# Patient Record
Sex: MALE | Race: WHITE | NOT HISPANIC OR LATINO
[De-identification: names, ages, dates, MRNs, and addresses within clinical notes are randomized per-mention and may not be internally consistent; named-entity substitution may affect disease eponyms.]

---

## 2017-07-06 ENCOUNTER — APPOINTMENT (OUTPATIENT)
Dept: GASTROENTEROLOGY | Facility: CLINIC | Age: 72
End: 2017-07-06

## 2017-07-06 VITALS
WEIGHT: 219 LBS | HEART RATE: 76 BPM | HEIGHT: 71 IN | DIASTOLIC BLOOD PRESSURE: 84 MMHG | SYSTOLIC BLOOD PRESSURE: 143 MMHG | RESPIRATION RATE: 18 BRPM | BODY MASS INDEX: 30.66 KG/M2

## 2017-07-06 DIAGNOSIS — K64.4 RESIDUAL HEMORRHOIDAL SKIN TAGS: ICD-10-CM

## 2017-07-06 DIAGNOSIS — Z87.438 PERSONAL HISTORY OF OTHER DISEASES OF MALE GENITAL ORGANS: ICD-10-CM

## 2017-07-06 RX ORDER — LIFITEGRAST 50 MG/ML
5 SOLUTION/ DROPS OPHTHALMIC
Qty: 60 | Refills: 0 | Status: DISCONTINUED | COMMUNITY
Start: 2017-05-31

## 2017-09-11 ENCOUNTER — TRANSCRIPTION ENCOUNTER (OUTPATIENT)
Age: 72
End: 2017-09-11

## 2017-10-04 ENCOUNTER — OUTPATIENT (OUTPATIENT)
Dept: OUTPATIENT SERVICES | Facility: HOSPITAL | Age: 72
LOS: 1 days | End: 2017-10-04
Payer: MEDICARE

## 2017-10-04 ENCOUNTER — RESULT REVIEW (OUTPATIENT)
Age: 72
End: 2017-10-04

## 2017-10-04 ENCOUNTER — APPOINTMENT (OUTPATIENT)
Dept: GASTROENTEROLOGY | Facility: GI CENTER | Age: 72
End: 2017-10-04
Payer: MEDICARE

## 2017-10-04 DIAGNOSIS — K21.9 GASTRO-ESOPHAGEAL REFLUX DISEASE WITHOUT ESOPHAGITIS: ICD-10-CM

## 2017-10-04 PROCEDURE — 88305 TISSUE EXAM BY PATHOLOGIST: CPT

## 2017-10-04 PROCEDURE — 88342 IMHCHEM/IMCYTCHM 1ST ANTB: CPT

## 2017-10-04 PROCEDURE — 88342 IMHCHEM/IMCYTCHM 1ST ANTB: CPT | Mod: 26

## 2017-10-04 PROCEDURE — 88305 TISSUE EXAM BY PATHOLOGIST: CPT | Mod: 26

## 2017-10-04 PROCEDURE — 43239 EGD BIOPSY SINGLE/MULTIPLE: CPT

## 2017-10-06 LAB — SURGICAL PATHOLOGY FINAL REPORT - CH: SIGNIFICANT CHANGE UP

## 2017-12-29 ENCOUNTER — RX RENEWAL (OUTPATIENT)
Age: 72
End: 2017-12-29

## 2021-04-25 ENCOUNTER — EMERGENCY (EMERGENCY)
Facility: HOSPITAL | Age: 76
LOS: 1 days | Discharge: HOME | End: 2021-04-25
Attending: EMERGENCY MEDICINE
Payer: MEDICARE

## 2021-04-25 VITALS
WEIGHT: 210.1 LBS | RESPIRATION RATE: 20 BRPM | OXYGEN SATURATION: 99 % | DIASTOLIC BLOOD PRESSURE: 78 MMHG | TEMPERATURE: 95 F | HEART RATE: 102 BPM | SYSTOLIC BLOOD PRESSURE: 119 MMHG | HEIGHT: 69 IN

## 2021-04-25 LAB
ALBUMIN SERPL ELPH-MCNC: 4.2 G/DL — SIGNIFICANT CHANGE UP (ref 3.3–5.2)
ALP SERPL-CCNC: 131 U/L — HIGH (ref 40–120)
ALT FLD-CCNC: 33 U/L — SIGNIFICANT CHANGE UP
ANION GAP SERPL CALC-SCNC: 17 MMOL/L — SIGNIFICANT CHANGE UP (ref 5–17)
APPEARANCE UR: CLEAR — SIGNIFICANT CHANGE UP
APTT BLD: 29.6 SEC — SIGNIFICANT CHANGE UP (ref 27.5–35.5)
AST SERPL-CCNC: 20 U/L — SIGNIFICANT CHANGE UP
BACTERIA # UR AUTO: ABNORMAL
BASE EXCESS BLDV CALC-SCNC: -5.4 MMOL/L — LOW (ref -2–2)
BASE EXCESS BLDV CALC-SCNC: -5.9 MMOL/L — LOW (ref -2–2)
BASOPHILS # BLD AUTO: 0.07 K/UL — SIGNIFICANT CHANGE UP (ref 0–0.2)
BASOPHILS NFR BLD AUTO: 0.9 % — SIGNIFICANT CHANGE UP (ref 0–2)
BILIRUB SERPL-MCNC: 0.4 MG/DL — SIGNIFICANT CHANGE UP (ref 0.4–2)
BILIRUB UR-MCNC: NEGATIVE — SIGNIFICANT CHANGE UP
BUN SERPL-MCNC: 15 MG/DL — SIGNIFICANT CHANGE UP (ref 8–20)
CA-I SERPL-SCNC: 1.02 MMOL/L — LOW (ref 1.15–1.33)
CA-I SERPL-SCNC: 1.06 MMOL/L — LOW (ref 1.15–1.33)
CALCIUM SERPL-MCNC: 8.3 MG/DL — LOW (ref 8.6–10.2)
CHLORIDE BLDV-SCNC: 103 MMOL/L — SIGNIFICANT CHANGE UP (ref 98–107)
CHLORIDE BLDV-SCNC: 104 MMOL/L — SIGNIFICANT CHANGE UP (ref 98–107)
CHLORIDE SERPL-SCNC: 98 MMOL/L — SIGNIFICANT CHANGE UP (ref 98–107)
CO2 SERPL-SCNC: 20 MMOL/L — LOW (ref 22–29)
COLOR SPEC: YELLOW — SIGNIFICANT CHANGE UP
CREAT SERPL-MCNC: 1.02 MG/DL — SIGNIFICANT CHANGE UP (ref 0.5–1.3)
DIFF PNL FLD: NEGATIVE — SIGNIFICANT CHANGE UP
EOSINOPHIL # BLD AUTO: 0.27 K/UL — SIGNIFICANT CHANGE UP (ref 0–0.5)
EOSINOPHIL NFR BLD AUTO: 3.5 % — SIGNIFICANT CHANGE UP (ref 0–6)
EPI CELLS # UR: SIGNIFICANT CHANGE UP
GAS PNL BLDV: 137 MMOL/L — SIGNIFICANT CHANGE UP (ref 135–145)
GAS PNL BLDV: 138 MMOL/L — SIGNIFICANT CHANGE UP (ref 135–145)
GAS PNL BLDV: SIGNIFICANT CHANGE UP
GLUCOSE BLDV-MCNC: 141 MG/DL — HIGH (ref 70–99)
GLUCOSE BLDV-MCNC: 159 MG/DL — HIGH (ref 70–99)
GLUCOSE SERPL-MCNC: 151 MG/DL — HIGH (ref 70–99)
GLUCOSE UR QL: 1000 MG/DL
HCO3 BLDV-SCNC: 19 MMOL/L — LOW (ref 20–26)
HCO3 BLDV-SCNC: 20 MMOL/L — LOW (ref 21–29)
HCT VFR BLD CALC: 49 % — SIGNIFICANT CHANGE UP (ref 39–50)
HCT VFR BLDA CALC: 50 — SIGNIFICANT CHANGE UP (ref 39–50)
HCT VFR BLDA CALC: 52 — HIGH (ref 39–50)
HGB BLD CALC-MCNC: 16.4 G/DL — SIGNIFICANT CHANGE UP (ref 13–17)
HGB BLD CALC-MCNC: 17 G/DL — SIGNIFICANT CHANGE UP (ref 13–17)
HGB BLD-MCNC: 16.1 G/DL — SIGNIFICANT CHANGE UP (ref 13–17)
IMM GRANULOCYTES NFR BLD AUTO: 1.5 % — SIGNIFICANT CHANGE UP (ref 0–1.5)
INR BLD: 0.95 RATIO — SIGNIFICANT CHANGE UP (ref 0.88–1.16)
KETONES UR-MCNC: NEGATIVE — SIGNIFICANT CHANGE UP
LACTATE BLDV-MCNC: 2.9 MMOL/L — HIGH (ref 0.5–2)
LACTATE BLDV-MCNC: 3.6 MMOL/L — HIGH (ref 0.5–2)
LEUKOCYTE ESTERASE UR-ACNC: NEGATIVE — SIGNIFICANT CHANGE UP
LYMPHOCYTES # BLD AUTO: 1.36 K/UL — SIGNIFICANT CHANGE UP (ref 1–3.3)
LYMPHOCYTES # BLD AUTO: 17.4 % — SIGNIFICANT CHANGE UP (ref 13–44)
MAGNESIUM SERPL-MCNC: 2.1 MG/DL — SIGNIFICANT CHANGE UP (ref 1.6–2.6)
MCHC RBC-ENTMCNC: 30.6 PG — SIGNIFICANT CHANGE UP (ref 27–34)
MCHC RBC-ENTMCNC: 32.9 GM/DL — SIGNIFICANT CHANGE UP (ref 32–36)
MCV RBC AUTO: 93.2 FL — SIGNIFICANT CHANGE UP (ref 80–100)
MONOCYTES # BLD AUTO: 0.86 K/UL — SIGNIFICANT CHANGE UP (ref 0–0.9)
MONOCYTES NFR BLD AUTO: 11 % — SIGNIFICANT CHANGE UP (ref 2–14)
NEUTROPHILS # BLD AUTO: 5.12 K/UL — SIGNIFICANT CHANGE UP (ref 1.8–7.4)
NEUTROPHILS NFR BLD AUTO: 65.7 % — SIGNIFICANT CHANGE UP (ref 43–77)
NITRITE UR-MCNC: NEGATIVE — SIGNIFICANT CHANGE UP
NT-PROBNP SERPL-SCNC: 34 PG/ML — SIGNIFICANT CHANGE UP (ref 0–300)
OTHER CELLS CSF MANUAL: 12 ML/DL — LOW (ref 18–22)
OTHER CELLS CSF MANUAL: 22 ML/DL — SIGNIFICANT CHANGE UP (ref 18–22)
PCO2 BLDV: 45 MMHG — SIGNIFICANT CHANGE UP (ref 42–55)
PCO2 BLDV: 54 MMHG — SIGNIFICANT CHANGE UP (ref 42–55)
PH BLDV: 7.24 — LOW (ref 7.32–7.43)
PH BLDV: 7.28 — LOW (ref 7.32–7.43)
PH UR: 6 — SIGNIFICANT CHANGE UP (ref 5–8)
PLATELET # BLD AUTO: 154 K/UL — SIGNIFICANT CHANGE UP (ref 150–400)
PO2 BLDV: 34 MMHG — SIGNIFICANT CHANGE UP (ref 25–45)
PO2 BLDV: 81 MMHG — HIGH (ref 25–45)
POTASSIUM BLDV-SCNC: 3.8 MMOL/L — SIGNIFICANT CHANGE UP (ref 3.4–4.5)
POTASSIUM BLDV-SCNC: 4.2 MMOL/L — SIGNIFICANT CHANGE UP (ref 3.4–4.5)
POTASSIUM SERPL-MCNC: 3.9 MMOL/L — SIGNIFICANT CHANGE UP (ref 3.5–5.3)
POTASSIUM SERPL-SCNC: 3.9 MMOL/L — SIGNIFICANT CHANGE UP (ref 3.5–5.3)
PROT SERPL-MCNC: 6.7 G/DL — SIGNIFICANT CHANGE UP (ref 6.6–8.7)
PROT UR-MCNC: 15 MG/DL
PROTHROM AB SERPL-ACNC: 11 SEC — SIGNIFICANT CHANGE UP (ref 10.6–13.6)
RBC # BLD: 5.26 M/UL — SIGNIFICANT CHANGE UP (ref 4.2–5.8)
RBC # FLD: 13.3 % — SIGNIFICANT CHANGE UP (ref 10.3–14.5)
RBC CASTS # UR COMP ASSIST: SIGNIFICANT CHANGE UP /HPF (ref 0–4)
SAO2 % BLDV: 54 % — SIGNIFICANT CHANGE UP
SAO2 % BLDV: 95 % — SIGNIFICANT CHANGE UP
SODIUM SERPL-SCNC: 135 MMOL/L — SIGNIFICANT CHANGE UP (ref 135–145)
SP GR SPEC: 1.01 — SIGNIFICANT CHANGE UP (ref 1.01–1.02)
TROPONIN T SERPL-MCNC: <0.01 NG/ML — SIGNIFICANT CHANGE UP (ref 0–0.06)
TSH SERPL-MCNC: 1.44 UIU/ML — SIGNIFICANT CHANGE UP (ref 0.27–4.2)
UROBILINOGEN FLD QL: NEGATIVE MG/DL — SIGNIFICANT CHANGE UP
WBC # BLD: 7.8 K/UL — SIGNIFICANT CHANGE UP (ref 3.8–10.5)
WBC # FLD AUTO: 7.8 K/UL — SIGNIFICANT CHANGE UP (ref 3.8–10.5)
WBC UR QL: SIGNIFICANT CHANGE UP

## 2021-04-25 PROCEDURE — 93010 ELECTROCARDIOGRAM REPORT: CPT

## 2021-04-25 PROCEDURE — 71045 X-RAY EXAM CHEST 1 VIEW: CPT | Mod: 26

## 2021-04-25 PROCEDURE — 99285 EMERGENCY DEPT VISIT HI MDM: CPT | Mod: CS

## 2021-04-25 RX ORDER — SODIUM CHLORIDE 9 MG/ML
1000 INJECTION, SOLUTION INTRAVENOUS ONCE
Refills: 0 | Status: COMPLETED | OUTPATIENT
Start: 2021-04-25 | End: 2021-04-25

## 2021-04-25 RX ORDER — SODIUM CHLORIDE 9 MG/ML
1000 INJECTION INTRAMUSCULAR; INTRAVENOUS; SUBCUTANEOUS ONCE
Refills: 0 | Status: COMPLETED | OUTPATIENT
Start: 2021-04-25 | End: 2021-04-25

## 2021-04-25 RX ADMIN — SODIUM CHLORIDE 1000 MILLILITER(S): 9 INJECTION, SOLUTION INTRAVENOUS at 21:14

## 2021-04-25 RX ADMIN — SODIUM CHLORIDE 1000 MILLILITER(S): 9 INJECTION INTRAMUSCULAR; INTRAVENOUS; SUBCUTANEOUS at 22:57

## 2021-04-25 RX ADMIN — SODIUM CHLORIDE 1000 MILLILITER(S): 9 INJECTION, SOLUTION INTRAVENOUS at 22:14

## 2021-04-25 NOTE — ED PROVIDER NOTE - NSFOLLOWUPINSTRUCTIONS_ED_ALL_ED_FT
- Follow up with your doctor within 2-3 days.   - Follow up with your Cardiologist.   - Bring results with you to the appointment.   - Take Tylenol (Acetaminophen) 650mg or Motrin (Ibuprofen/Advil) 600mg every 6 hours as needed for fever or pain.   - Stay well hydrated.   - Stay at home until cleared by your doctor.  - See attached COVID-19 instructions.   - Return to the ED for any new or worsening symptoms.     Syncope    Syncope is when you temporarily lose consciousness, also called fainting or passing out. It is caused by a sudden decrease in blood flow to the brain. Even though most causes of syncope are not dangerous, syncope can possibly be a sign of a serious medical problem. Signs that you may be about to faint include feeling dizzy, lightheaded, nausea, visual changes, or cold/clammy skin. Do not drive, operate heavy machinery, or play sports until your health care provider says it is okay.    SEEK IMMEDIATE MEDICAL CARE IF YOU HAVE ANY OF THE FOLLOWING SYMPTOMS: severe headache, pain in your chest/abdomen/back, bleeding from your mouth or rectum, palpitations, shortness of breath, pain with breathing, seizure, confusion, or trouble walking.

## 2021-04-25 NOTE — ED PROVIDER NOTE - PATIENT PORTAL LINK FT
You can access the FollowMyHealth Patient Portal offered by [facility] by registering at the following website: [email or web address]. By joining [employer]’s FollowMyHealth portal, you will also be able to view your health information using other applications (apps) compatible with our system.

## 2021-04-25 NOTE — ED PROVIDER NOTE - OBJECTIVE STATEMENT
75yom w/ HTN p/w syncope. Pt states he was sitting around with friends smoking cigars and having a beer, he had a sudden onset of "not feeling right," followed by witnessed syncope, unknown duration. He awoke immediately back to baseline, but on EMS arrival was bradycardic in the 30s and hypotense. Was given atropine 0.5mg. He denies any complaints currently other than being cold. He has had similar episodes in the past of passing out, follows with a cardiologist.

## 2021-04-25 NOTE — ED ADULT TRIAGE NOTE - CHIEF COMPLAINT QUOTE
Pt A&Ox4 states "I felt strange and don't really remember what happened and then the ambulance was there."  BIBA c/o being at bar became pale, hypotensive, and laura. Patient received 1L NS and 0.5 Atropine by EMS. MD KOREY redmond.

## 2021-04-25 NOTE — ED ADULT NURSE NOTE - OBJECTIVE STATEMENT
pt reports he was at the bar having some drinks and felt funny, next thing he remembers is waking up with everyone around him. denies any injuries/fall during this syncopal event, states he was seated at the time. as per EMS pt was hypotensive, sinus bradycardia in the 30s, given 0.5mg Atropine IV. arrives with 18g LAC. pt without any complaints at this present time. a&ox3.

## 2021-04-25 NOTE — ED PROVIDER NOTE - CLINICAL SUMMARY MEDICAL DECISION MAKING FREE TEXT BOX
Brief, resolved syncopal event, was transiently hypotense and bradycardic after event but not presently, suspect this was likely a vagal response. ECG NSR with normal intervals, no ischemic changes, will check serial trops to rule out an ischemic event. No fever, no leukocytosis, no infectious symptoms. Noted elevated lactate but appears well perfused with no evidence of end organ injury or sepsis. Will continue to trend, monitor on tele.

## 2021-04-25 NOTE — ED PROVIDER NOTE - PROGRESS NOTE DETAILS
John PORTILLO: lactate originally uptrending but patient had not finished his IVF, NS bolus done, lactate downtrending. Patient noted to be covid positive, is asymptomatic. Currently feels well, would like to go home. Strict return precautions given.

## 2021-04-25 NOTE — ED ADULT NURSE NOTE - NSIMPLEMENTINTERV_GEN_ALL_ED
Implemented All Fall Risk Interventions:  [place] to call system. Call bell, personal items and telephone within reach. Instruct patient to call for assistance. Room bathroom lighting operational. Non-slip footwear when patient is off stretcher. Physically safe environment: no spills, clutter or unnecessary equipment. Stretcher in lowest position, wheels locked, appropriate side rails in place. Provide visual cue, wrist band, yellow gown, etc. Monitor gait and stability. Monitor for mental status changes and reorient to person, place, and time. Review medications for side effects contributing to fall risk. Reinforce activity limits and safety measures with patient and family.

## 2021-04-26 VITALS
RESPIRATION RATE: 18 BRPM | DIASTOLIC BLOOD PRESSURE: 80 MMHG | SYSTOLIC BLOOD PRESSURE: 134 MMHG | HEART RATE: 87 BPM | OXYGEN SATURATION: 96 %

## 2021-04-26 LAB
BASE EXCESS BLDV CALC-SCNC: -6.2 MMOL/L — LOW (ref -2–2)
CA-I SERPL-SCNC: 1.08 MMOL/L — LOW (ref 1.15–1.33)
CHLORIDE BLDV-SCNC: 105 MMOL/L — SIGNIFICANT CHANGE UP (ref 98–107)
GAS PNL BLDV: 138 MMOL/L — SIGNIFICANT CHANGE UP (ref 135–145)
GAS PNL BLDV: SIGNIFICANT CHANGE UP
GAS PNL BLDV: SIGNIFICANT CHANGE UP
GLUCOSE BLDV-MCNC: 147 MG/DL — HIGH (ref 70–99)
HCO3 BLDV-SCNC: 19 MMOL/L — LOW (ref 20–26)
HCT VFR BLDA CALC: 53 — HIGH (ref 39–50)
HGB BLD CALC-MCNC: 17.2 G/DL — HIGH (ref 13–17)
LACTATE BLDV-MCNC: 3.4 MMOL/L — HIGH (ref 0.5–2)
LACTATE BLDV-MCNC: 3.9 MMOL/L — HIGH (ref 0.5–2)
OTHER CELLS CSF MANUAL: 16 ML/DL — LOW (ref 18–22)
PCO2 BLDV: 48 MMHG — SIGNIFICANT CHANGE UP (ref 42–55)
PH BLDV: 7.26 — LOW (ref 7.32–7.43)
PO2 BLDV: 43 MMHG — SIGNIFICANT CHANGE UP (ref 25–45)
POTASSIUM BLDV-SCNC: 4.5 MMOL/L — SIGNIFICANT CHANGE UP (ref 3.4–4.5)
RAPID RVP RESULT: DETECTED
SAO2 % BLDV: 70 % — SIGNIFICANT CHANGE UP
SARS-COV-2 RNA SPEC QL NAA+PROBE: DETECTED
TROPONIN T SERPL-MCNC: <0.01 NG/ML — SIGNIFICANT CHANGE UP (ref 0–0.06)

## 2021-04-26 PROCEDURE — 85014 HEMATOCRIT: CPT

## 2021-04-26 PROCEDURE — 82803 BLOOD GASES ANY COMBINATION: CPT

## 2021-04-26 PROCEDURE — 82947 ASSAY GLUCOSE BLOOD QUANT: CPT

## 2021-04-26 PROCEDURE — 96360 HYDRATION IV INFUSION INIT: CPT

## 2021-04-26 PROCEDURE — 85025 COMPLETE CBC W/AUTO DIFF WBC: CPT

## 2021-04-26 PROCEDURE — 84484 ASSAY OF TROPONIN QUANT: CPT

## 2021-04-26 PROCEDURE — 93005 ELECTROCARDIOGRAM TRACING: CPT

## 2021-04-26 PROCEDURE — 83880 ASSAY OF NATRIURETIC PEPTIDE: CPT

## 2021-04-26 PROCEDURE — 80053 COMPREHEN METABOLIC PANEL: CPT

## 2021-04-26 PROCEDURE — 0225U NFCT DS DNA&RNA 21 SARSCOV2: CPT

## 2021-04-26 PROCEDURE — 36415 COLL VENOUS BLD VENIPUNCTURE: CPT

## 2021-04-26 PROCEDURE — 83735 ASSAY OF MAGNESIUM: CPT

## 2021-04-26 PROCEDURE — 84443 ASSAY THYROID STIM HORMONE: CPT

## 2021-04-26 PROCEDURE — 81001 URINALYSIS AUTO W/SCOPE: CPT

## 2021-04-26 PROCEDURE — 82435 ASSAY OF BLOOD CHLORIDE: CPT

## 2021-04-26 PROCEDURE — 87086 URINE CULTURE/COLONY COUNT: CPT

## 2021-04-26 PROCEDURE — 82962 GLUCOSE BLOOD TEST: CPT

## 2021-04-26 PROCEDURE — 85610 PROTHROMBIN TIME: CPT

## 2021-04-26 PROCEDURE — 84295 ASSAY OF SERUM SODIUM: CPT

## 2021-04-26 PROCEDURE — 84132 ASSAY OF SERUM POTASSIUM: CPT

## 2021-04-26 PROCEDURE — 99284 EMERGENCY DEPT VISIT MOD MDM: CPT | Mod: 25

## 2021-04-26 PROCEDURE — 82330 ASSAY OF CALCIUM: CPT

## 2021-04-26 PROCEDURE — 71045 X-RAY EXAM CHEST 1 VIEW: CPT

## 2021-04-26 PROCEDURE — 83605 ASSAY OF LACTIC ACID: CPT

## 2021-04-26 PROCEDURE — 85730 THROMBOPLASTIN TIME PARTIAL: CPT

## 2021-04-26 PROCEDURE — 85018 HEMOGLOBIN: CPT

## 2021-04-26 RX ORDER — SODIUM CHLORIDE 9 MG/ML
1000 INJECTION INTRAMUSCULAR; INTRAVENOUS; SUBCUTANEOUS ONCE
Refills: 0 | Status: COMPLETED | OUTPATIENT
Start: 2021-04-26 | End: 2021-04-26

## 2021-04-26 RX ADMIN — SODIUM CHLORIDE 1000 MILLILITER(S): 9 INJECTION INTRAMUSCULAR; INTRAVENOUS; SUBCUTANEOUS at 01:18

## 2021-04-26 RX ADMIN — SODIUM CHLORIDE 1000 MILLILITER(S): 9 INJECTION INTRAMUSCULAR; INTRAVENOUS; SUBCUTANEOUS at 01:01

## 2021-04-27 LAB
CULTURE RESULTS: SIGNIFICANT CHANGE UP
SPECIMEN SOURCE: SIGNIFICANT CHANGE UP

## 2021-05-10 ENCOUNTER — APPOINTMENT (OUTPATIENT)
Dept: GASTROENTEROLOGY | Facility: CLINIC | Age: 76
End: 2021-05-10
Payer: MEDICARE

## 2021-05-10 VITALS
OXYGEN SATURATION: 98 % | SYSTOLIC BLOOD PRESSURE: 154 MMHG | BODY MASS INDEX: 29.29 KG/M2 | DIASTOLIC BLOOD PRESSURE: 100 MMHG | TEMPERATURE: 97.2 F | WEIGHT: 210 LBS | RESPIRATION RATE: 14 BRPM | HEART RATE: 87 BPM

## 2021-05-10 DIAGNOSIS — K44.9 DIAPHRAGMATIC HERNIA W/OUT OBSTRUCTION OR GANGRENE: ICD-10-CM

## 2021-05-10 PROBLEM — I10 ESSENTIAL (PRIMARY) HYPERTENSION: Chronic | Status: ACTIVE | Noted: 2021-04-25

## 2021-05-10 PROBLEM — E11.9 TYPE 2 DIABETES MELLITUS WITHOUT COMPLICATIONS: Chronic | Status: ACTIVE | Noted: 2021-04-25

## 2021-05-10 PROCEDURE — 99215 OFFICE O/P EST HI 40 MIN: CPT

## 2021-05-10 RX ORDER — FLUTICASONE PROPIONATE 50 UG/1
50 SPRAY, METERED NASAL
Qty: 32 | Refills: 0 | Status: DISCONTINUED | COMMUNITY
Start: 2016-12-30 | End: 2021-05-10

## 2021-05-10 RX ORDER — BLOOD SUGAR DIAGNOSTIC
STRIP MISCELLANEOUS
Qty: 100 | Refills: 0 | Status: DISCONTINUED | COMMUNITY
Start: 2017-01-10 | End: 2021-05-10

## 2021-05-10 RX ORDER — METHYLPREDNISOLONE 4 MG/1
4 TABLET ORAL
Qty: 21 | Refills: 0 | Status: DISCONTINUED | COMMUNITY
Start: 2021-04-08

## 2021-05-10 RX ORDER — AZITHROMYCIN 250 MG/1
250 TABLET, FILM COATED ORAL
Qty: 6 | Refills: 0 | Status: DISCONTINUED | COMMUNITY
Start: 2021-04-08

## 2021-05-10 RX ORDER — LANCETS
EACH MISCELLANEOUS
Qty: 100 | Refills: 0 | Status: DISCONTINUED | COMMUNITY
Start: 2017-01-10 | End: 2021-05-10

## 2021-05-10 RX ORDER — ATENOLOL 50 MG/1
50 TABLET ORAL
Qty: 30 | Refills: 0 | Status: DISCONTINUED | COMMUNITY
Start: 2017-06-01 | End: 2021-05-10

## 2021-05-10 RX ORDER — SIMVASTATIN 40 MG/1
40 TABLET, FILM COATED ORAL
Qty: 30 | Refills: 0 | Status: DISCONTINUED | COMMUNITY
Start: 2017-06-01 | End: 2021-05-10

## 2021-05-10 RX ORDER — LEVOFLOXACIN 500 MG/1
500 TABLET, FILM COATED ORAL
Qty: 7 | Refills: 0 | Status: DISCONTINUED | COMMUNITY
Start: 2021-01-22

## 2021-05-10 RX ORDER — IBUPROFEN 600 MG/1
600 TABLET, FILM COATED ORAL
Refills: 0 | Status: DISCONTINUED | COMMUNITY
End: 2021-05-10

## 2021-05-10 RX ORDER — CANAGLIFLOZIN 300 MG/1
300 TABLET, FILM COATED ORAL
Qty: 30 | Refills: 0 | Status: DISCONTINUED | COMMUNITY
Start: 2016-10-21 | End: 2021-05-10

## 2021-05-10 RX ORDER — RANITIDINE HYDROCHLORIDE 150 MG/1
150 TABLET, FILM COATED ORAL
Refills: 0 | Status: DISCONTINUED | COMMUNITY
End: 2021-05-10

## 2021-05-10 NOTE — CONSULT LETTER
[Dear  ___] : Dear  [unfilled], [Consult Letter:] : I had the pleasure of evaluating your patient, [unfilled]. [Please see my note below.] : Please see my note below. [Consult Closing:] : Thank you very much for allowing me to participate in the care of this patient.  If you have any questions, please do not hesitate to contact me. [Sincerely,] : Sincerely, [FreeTextEntry1] : Personal history of colon polyp due for surveillance colonoscopy.  Personal history of long segment Webster's esophagus due for surveillance endoscopy.  Asymptomatic.  All recent blood work normal.  Regiments made.  Results to follow.\par Expanding size of cyst in head of pancreas on recent CT imaging.  MRI with and without IV contrast requested. [FreeTextEntry3] : Bill Gonzales MD FACG\par Diplomate American Board of Internal Medicine and Gastroenterolgy\par [facility]\par

## 2021-05-10 NOTE — HISTORY OF PRESENT ILLNESS
[FreeTextEntry1] : 75-year-old white male with history of hypertension hyperlipidemia obesity diabetes and colon polyp.  Patient had had a prior screening colonoscopy in 6/19/2015 showing a small 6 mm sessile serrated adenoma in the splenic flexure.  A 5-year interval exam had been advised.  He remains asymptomatic from a lower GI point of view.  He has occasional diarrhea and/or constipation but for the most part bowel movements are formed regular and easy.  No rectal bleeding.  No abdominal pain.  No unexpected weight loss.  Weight has fluctuated considerably.  When last seen patient was 220; patient had gone as high as 247 and most recently down to 200 and currently at 210 pounds.\par Patient is also noted to have a long segment Webster's esophagus.  Last upper endoscopy was performed 10/4/2017 showing a large hiatus hernia and a long segment Webster's esophagus extending from 20 to 34 cm.  I.e. a 14 cm segment of Webster's esophagus.  All biopsies at that time were negative for dysplasia.  No lesions identified.  Patient has been consistently on PPI medication over the past 3-1/2 years.  He notes occasional bouts of regurgitation.  No heartburn.  No dysphagia.  No odynophagia.  No nausea or vomiting or bleeding.  No abdominal pain.  With regurgitation he feels low chest discomfort and oftentimes will therefore be required to sleep in a semiupright position in a recliner.  This occurs about 2 times per week.  No pill dysphagia.  No GI bleeding.  Off ranitidine x2 to 3 years.\par \par Recent CAT scan abdomen and pelvis was performed in January at his [facility].  A 2 cm cyst in the head of the pancreas is noted.  In comparison to a prior study done 10 years prior the cyst is larger.  Initial size not recorded.  Patient remains asymptomatic this regard.  No upper abdominal pain no jaundice or fevers or chills or weight loss, or back pain.  MRI with contrast advised for further evaluation by radiology.\par \par Patient was recently evaluated 4/25/2021 at [facility] ER for an episode of syncope.  This is his seventh episode of syncope in his lifetime.  He has been followed by cardiology Dr. Dumas in [place].  No abnormalities found.  ER blood work was all negative including CBC, CMP, coagulation profiles and cardiac enzymes.  Glucose 150.  No further investigations felt necessary.  Scheduled for routine follow-up with cardiology in 6 months.  He did and released from the ER.  Asymptomatic ever since.

## 2021-05-10 NOTE — REASON FOR VISIT
[Consultation] : a consultation visit [FreeTextEntry1] : Webster's esophagus, hiatus hernia, history of colon polyps.  Cyst head of pancreas.

## 2021-05-10 NOTE — PHYSICAL EXAM
[General Appearance - Alert] : alert [General Appearance - In No Acute Distress] : in no acute distress [Sclera] : the sclera and conjunctiva were normal [PERRL With Normal Accommodation] : pupils were equal in size, round, and reactive to light [Extraocular Movements] : extraocular movements were intact [Outer Ear] : the ears and nose were normal in appearance [Oropharynx] : the oropharynx was normal [Neck Appearance] : the appearance of the neck was normal [Neck Cervical Mass (___cm)] : no neck mass was observed [Jugular Venous Distention Increased] : there was no jugular-venous distention [Thyroid Diffuse Enlargement] : the thyroid was not enlarged [Thyroid Nodule] : there were no palpable thyroid nodules [Auscultation Breath Sounds / Voice Sounds] : lungs were clear to auscultation bilaterally [Heart Rate And Rhythm] : heart rate was normal and rhythm regular [Heart Sounds] : normal S1 and S2 [Heart Sounds Gallop] : no gallops [Murmurs] : no murmurs [Heart Sounds Pericardial Friction Rub] : no pericardial rub [Bowel Sounds] : normal bowel sounds [Abdomen Soft] : soft [Abdomen Tenderness] : non-tender [] : no hepato-splenomegaly [Abdomen Mass (___ Cm)] : no abdominal mass palpated [Cervical Lymph Nodes Enlarged Posterior Bilaterally] : posterior cervical [Cervical Lymph Nodes Enlarged Anterior Bilaterally] : anterior cervical [Supraclavicular Lymph Nodes Enlarged Bilaterally] : supraclavicular [Axillary Lymph Nodes Enlarged Bilaterally] : axillary [Femoral Lymph Nodes Enlarged Bilaterally] : femoral [Inguinal Lymph Nodes Enlarged Bilaterally] : inguinal [No CVA Tenderness] : no ~M costovertebral angle tenderness [No Spinal Tenderness] : no spinal tenderness [FreeTextEntry1] : left knee scars

## 2021-05-10 NOTE — ASSESSMENT
[FreeTextEntry1] : Personal history of Webster's esophagus, long segment.  Modestly symptomatic with regurgitation most likely related to his large hiatus hernia and/or reflux.  Otherwise clinically silent.  No dysphagia or odynophagia or unexpected weight loss.  Appropriate candidate for a surveillance endoscopy.  The procedure, its risk benefits and prep, were explained to the patient, who understands and is agreeable to proceed.  ASA #3.  Mallampati #2.   Aspirin to be held for 1 week prior to the procedure.  Repeat blood work not necessary as all recent blood work 2 weeks ago in ER was normal, including coags and LFTs.\par Personal history of a colon polyp, sessile serrated adenoma removed in 2015 from splenic flexure.  An interval colonoscopy is appropriate.  The procedure, its risk benefits and prep, were reviewed with the patient, who understands and is agreeable to proceed.  Both procedures to be performed on same day.  Suprep will be utilized all on prep today.  Old aspirin for 1 week prior to procedure.  Procedures will be done early in day as patient is diabetic.  ASA #3.  Patient is in optimal medical condition to undergo planned procedures.  Arrangements made.  Results to follow.\par Personal history of a cyst in the head of the pancreas.  Recent imaging done January shows increasing size gradually over a long period of time of the cyst in head of pancreas.  Patient is asymptomatic.  No clinical jaundice.  All recent LFTs are normal.  Therefore no biliary obstruction.  Unclear etiology for cyst.  Radiologist suggests MRI with and without contrast of the abdomen.  Requested.  Will be done at [facility] as well prior imaging has been done there.  No special precautions.

## 2021-05-29 DIAGNOSIS — Z01.818 ENCOUNTER FOR OTHER PREPROCEDURAL EXAMINATION: ICD-10-CM

## 2021-05-30 ENCOUNTER — APPOINTMENT (OUTPATIENT)
Dept: DISASTER EMERGENCY | Facility: CLINIC | Age: 76
End: 2021-05-30

## 2021-06-02 ENCOUNTER — APPOINTMENT (OUTPATIENT)
Dept: GASTROENTEROLOGY | Facility: GI CENTER | Age: 76
End: 2021-06-02
Payer: MEDICARE

## 2021-06-02 ENCOUNTER — OUTPATIENT (OUTPATIENT)
Dept: OUTPATIENT SERVICES | Facility: HOSPITAL | Age: 76
LOS: 1 days | End: 2021-06-02
Payer: MEDICARE

## 2021-06-02 ENCOUNTER — RESULT REVIEW (OUTPATIENT)
Age: 76
End: 2021-06-02

## 2021-06-02 DIAGNOSIS — K63.5 POLYP OF COLON: ICD-10-CM

## 2021-06-02 DIAGNOSIS — Z86.010 PERSONAL HISTORY OF COLONIC POLYPS: ICD-10-CM

## 2021-06-02 DIAGNOSIS — K22.70 BARRETT'S ESOPHAGUS WITHOUT DYSPLASIA: ICD-10-CM

## 2021-06-02 LAB — GLUCOSE BLDC GLUCOMTR-MCNC: 137 MG/DL — HIGH (ref 70–99)

## 2021-06-02 PROCEDURE — 82962 GLUCOSE BLOOD TEST: CPT

## 2021-06-02 PROCEDURE — 43239 EGD BIOPSY SINGLE/MULTIPLE: CPT

## 2021-06-02 PROCEDURE — 45385 COLONOSCOPY W/LESION REMOVAL: CPT

## 2021-06-02 PROCEDURE — 88305 TISSUE EXAM BY PATHOLOGIST: CPT

## 2021-06-02 PROCEDURE — 43239 EGD BIOPSY SINGLE/MULTIPLE: CPT | Mod: 59

## 2021-06-02 PROCEDURE — 88305 TISSUE EXAM BY PATHOLOGIST: CPT | Mod: 26

## 2021-06-02 NOTE — PROCEDURE
[With Snare Polypectomy] : snare polypectomy [With Cautery] : cautery [Hx of Colon Polyps] : history of colon polyps [Bowel Prep Kit] : the patient took the appropriate bowel preparation kit as directed [Approved Diet Followed] : the patient avoided solid foods and adhered to the approved diet list for 24 hours prior to the procedure [Prep Qualtiy: ___] : Prep Quality:  [unfilled] [Withdrawal Time: ___] : Withdrawal Time:  [unfilled] [Left Lateral Decubitus] : The patient was positioned in the left lateral decubitus position [Abnormal Rectum] : a normal rectum [External Hemorrhoids] : no external hemorrhoids [Normal Prostate] : a normal prostate [Cecum (Landmarks/Transillum)] : and guided to the cecum which was identified by the anatomic landmarks of the appendiceal orifice and ileocecal valve and by transillumination in the right lower quadrant [Minimal Difficulty] : with minimal difficulty [Insufflated] : insufflated [Multiple Passes Needed] : after multiple passes [Retroflex View] : a retroflex view of the rectum was performed [Polyps] : polyps [Hot Snare Polypectomy] : hot snare polypectomy [Diverticulosis] : diverticulosis [No Complications] : There were no complications [de-identified] : Suprep, yesterday.  [de-identified] : TI not entered, due to sharp angulation.  \par At 90 cm in the cecal base an 8 mm sessile andres 1s polyp was removed completely with hot snare and recovered to path.  \par Adjacent to this a small 4 mm sessile andres 2a polyp was removed with hot snare and recovered to path.  Both sites were clean and dry.  No other lesions were noted.  \par ICV was normal.   [de-identified] : Extensive diverticulosis was noted.   [de-identified] : At 80 cm in proximal transverse colon a 8 mm sessile andres 1s polyp was removed wiwth hot snare and recovered to path.  site was clean / dry.  No other lesions were noted.   [de-identified] : Old tattoo site was noted at 35 cm.  All mucosa was intact.   [de-identified] : Including a retroflexion exam.  No proctitis or hemorrhoids.   [With Biopsy] : with biopsy [Procedure Explained] : The procedure was explained [Allergies Reviewed] : allergies reviewed. [Risks] : Risks [Benefits] : benefits [Alternatives] : alternatives [Consent Obtained] : written consent was obtained prior to the procedure and is detailed in the patient's record [Patient] : the patient [Automated Blood Pressure Cuff] : automated blood pressure cuff [Cardiac Monitor] : cardiac monitor [Pulse Oximeter] : pulse oximeter [Propofol ___ mg IV] : Propofol [unfilled] ~Umg intravenously [3] : 3 [Webster's] : Webster's [Biopsy] : biopsy [Normal] : Normal [Sent to Pathology] : was sent to pathology for analysis [Tolerated Well] : the patient tolerated the procedure well [Vital Signs Stable] : the vital signs were stable [de-identified] : Hiatus hernia.   [de-identified] : [id number] [de-identified] : Z line intact at 22 cm.  Long segment of Barretts esophagus noted.  No nodules or lesions.  4 quadrant biopsies were done at 22, 25, 27, 30, 32, 34, 36 cm.  HH extends from 38 to 42 cm.  No lesions within the HH sac.  No overt lesions within the tubular esophagus.   [de-identified] : Long segment BE.  No Nodules.  Dysplasia screening biopsies were done.

## 2021-06-02 NOTE — HISTORY OF PRESENT ILLNESS
[FreeTextEntry1] : 76 yo WM c Htn, DM2, HLD,  Obesity; Hx of colon polyps.  Hx of long segment BE at least 8 cm ; Last screening colon 6 yrs ago and last EGD 3+ yrs ago.  No symptoms.  BW via ED visit was neg.  Resolved form Covid infection 7 weeks ago.  No new medical issues.  Suprep  prepped.

## 2021-06-02 NOTE — HISTORY OF PRESENT ILLNESS
[FreeTextEntry1] : 74 yo WM c Htn, DM2, HLD,  Obesity; Hx of colon polyps.  Hx of long segment BE at least 8 cm ; Last screening colon 6 yrs ago and last EGD 3+ yrs ago.  No symptoms.  BW via ED visit was neg.  Resolved form Covid infection 7 weeks ago.  No new medical issues.  Suprep  prepped.

## 2021-06-02 NOTE — PROCEDURE
[With Snare Polypectomy] : snare polypectomy [With Cautery] : cautery [Hx of Colon Polyps] : history of colon polyps [Bowel Prep Kit] : the patient took the appropriate bowel preparation kit as directed [Approved Diet Followed] : the patient avoided solid foods and adhered to the approved diet list for 24 hours prior to the procedure [Prep Qualtiy: ___] : Prep Quality:  [unfilled] [Withdrawal Time: ___] : Withdrawal Time:  [unfilled] [Left Lateral Decubitus] : The patient was positioned in the left lateral decubitus position [Abnormal Rectum] : a normal rectum [External Hemorrhoids] : no external hemorrhoids [Normal Prostate] : a normal prostate [Cecum (Landmarks/Transillum)] : and guided to the cecum which was identified by the anatomic landmarks of the appendiceal orifice and ileocecal valve and by transillumination in the right lower quadrant [Minimal Difficulty] : with minimal difficulty [Insufflated] : insufflated [Multiple Passes Needed] : after multiple passes [Retroflex View] : a retroflex view of the rectum was performed [Polyps] : polyps [Hot Snare Polypectomy] : hot snare polypectomy [Diverticulosis] : diverticulosis [No Complications] : There were no complications [de-identified] : Suprep, yesterday.  [de-identified] : TI not entered, due to sharp angulation.  \par At 90 cm in the cecal base an 8 mm sessile andres 1s polyp was removed completely with hot snare and recovered to path.  \par Adjacent to this a small 4 mm sessile andres 2a polyp was removed with hot snare and recovered to path.  Both sites were clean and dry.  No other lesions were noted.  \par ICV was normal.   [de-identified] : Extensive diverticulosis was noted.   [de-identified] : At 80 cm in proximal transverse colon a 8 mm sessile andres 1s polyp was removed wiwth hot snare and recovered to path.  site was clean / dry.  No other lesions were noted.   [de-identified] : Old tattoo site was noted at 35 cm.  All mucosa was intact.   [de-identified] : Including a retroflexion exam.  No proctitis or hemorrhoids.   [With Biopsy] : with biopsy [Procedure Explained] : The procedure was explained [Allergies Reviewed] : allergies reviewed. [Risks] : Risks [Benefits] : benefits [Alternatives] : alternatives [Consent Obtained] : written consent was obtained prior to the procedure and is detailed in the patient's record [Patient] : the patient [Automated Blood Pressure Cuff] : automated blood pressure cuff [Cardiac Monitor] : cardiac monitor [Pulse Oximeter] : pulse oximeter [Propofol ___ mg IV] : Propofol [unfilled] ~Umg intravenously [3] : 3 [Webster's] : Webster's [Biopsy] : biopsy [Normal] : Normal [Sent to Pathology] : was sent to pathology for analysis [Tolerated Well] : the patient tolerated the procedure well [Vital Signs Stable] : the vital signs were stable [de-identified] : Hiatus hernia.   [de-identified] : [id number] [de-identified] : Z line intact at 22 cm.  Long segment of Barretts esophagus noted.  No nodules or lesions.  4 quadrant biopsies were done at 22, 25, 27, 30, 32, 34, 36 cm.  HH extends from 38 to 42 cm.  No lesions within the HH sac.  No overt lesions within the tubular esophagus.   [de-identified] : Long segment BE.  No Nodules.  Dysplasia screening biopsies were done.

## 2021-06-02 NOTE — ASSESSMENT
[FreeTextEntry1] : 3 colon polyps.  All completely removed.  Call in 1 week for path check.  Hold Aspirin x 1 week.  Repeat in 3 yrs for polyp surveillance.  \par Diverticulosis coli:  High fiber diet.  \par \par Long segment BE:  Multiple biopsies were done.  Call in 1 week for path check.  Same PPI forever.  \par Moderate HH:  ENRIQUE.

## 2021-06-02 NOTE — PHYSICAL EXAM
[General Appearance - Alert] : alert [General Appearance - In No Acute Distress] : in no acute distress [Sclera] : the sclera and conjunctiva were normal [PERRL With Normal Accommodation] : pupils were equal in size, round, and reactive to light [Extraocular Movements] : extraocular movements were intact [Outer Ear] : the ears and nose were normal in appearance [Oropharynx] : the oropharynx was normal [Neck Appearance] : the appearance of the neck was normal [Neck Cervical Mass (___cm)] : no neck mass was observed [Jugular Venous Distention Increased] : there was no jugular-venous distention [Thyroid Diffuse Enlargement] : the thyroid was not enlarged [Thyroid Nodule] : there were no palpable thyroid nodules [Auscultation Breath Sounds / Voice Sounds] : lungs were clear to auscultation bilaterally [Heart Rate And Rhythm] : heart rate was normal and rhythm regular [Heart Sounds] : normal S1 and S2 [Heart Sounds Gallop] : no gallops [Murmurs] : no murmurs [Heart Sounds Pericardial Friction Rub] : no pericardial rub [Bowel Sounds] : normal bowel sounds [Abdomen Soft] : soft [Abdomen Tenderness] : non-tender [] : no hepato-splenomegaly [Abdomen Mass (___ Cm)] : no abdominal mass palpated [Cervical Lymph Nodes Enlarged Anterior Bilaterally] : anterior cervical [Cervical Lymph Nodes Enlarged Posterior Bilaterally] : posterior cervical [Supraclavicular Lymph Nodes Enlarged Bilaterally] : supraclavicular [Axillary Lymph Nodes Enlarged Bilaterally] : axillary [Femoral Lymph Nodes Enlarged Bilaterally] : femoral [Inguinal Lymph Nodes Enlarged Bilaterally] : inguinal [No CVA Tenderness] : no ~M costovertebral angle tenderness [No Spinal Tenderness] : no spinal tenderness [FreeTextEntry1] : left knee scars

## 2021-06-04 LAB — SURGICAL PATHOLOGY STUDY: SIGNIFICANT CHANGE UP

## 2021-06-11 ENCOUNTER — NON-APPOINTMENT (OUTPATIENT)
Age: 76
End: 2021-06-11

## 2022-01-11 ENCOUNTER — APPOINTMENT (OUTPATIENT)
Dept: GASTROENTEROLOGY | Facility: CLINIC | Age: 77
End: 2022-01-11
Payer: MEDICARE

## 2022-01-11 VITALS
OXYGEN SATURATION: 97 % | TEMPERATURE: 98.4 F | DIASTOLIC BLOOD PRESSURE: 82 MMHG | BODY MASS INDEX: 28 KG/M2 | HEART RATE: 92 BPM | HEIGHT: 71 IN | SYSTOLIC BLOOD PRESSURE: 124 MMHG | WEIGHT: 200 LBS | RESPIRATION RATE: 14 BRPM

## 2022-01-11 PROCEDURE — 99213 OFFICE O/P EST LOW 20 MIN: CPT

## 2022-01-11 PROCEDURE — 82272 OCCULT BLD FECES 1-3 TESTS: CPT

## 2022-01-11 RX ORDER — ALBUTEROL SULFATE 90 UG/1
108 (90 BASE) INHALANT RESPIRATORY (INHALATION)
Qty: 26 | Refills: 0 | Status: ACTIVE | COMMUNITY
Start: 2021-04-08

## 2022-01-11 RX ORDER — ATORVASTATIN CALCIUM 20 MG/1
20 TABLET, FILM COATED ORAL
Qty: 90 | Refills: 0 | Status: ACTIVE | COMMUNITY
Start: 2021-05-07

## 2022-01-11 RX ORDER — ASPIRIN 81 MG
81 TABLET, DELAYED RELEASE (ENTERIC COATED) ORAL
Refills: 0 | Status: ACTIVE | COMMUNITY

## 2022-01-11 RX ORDER — SODIUM SULFATE, POTASSIUM SULFATE, MAGNESIUM SULFATE 17.5; 3.13; 1.6 G/ML; G/ML; G/ML
17.5-3.13-1.6 SOLUTION, CONCENTRATE ORAL
Qty: 1 | Refills: 0 | Status: DISCONTINUED | COMMUNITY
Start: 2021-05-10 | End: 2022-01-11

## 2022-01-11 RX ORDER — AMLODIPINE BESYLATE 10 MG/1
10 TABLET ORAL
Qty: 90 | Refills: 0 | Status: ACTIVE | COMMUNITY
Start: 2017-06-19

## 2022-01-11 RX ORDER — CODEINE SULFATE 30 MG/1
30 TABLET ORAL
Refills: 0 | Status: DISCONTINUED | COMMUNITY
End: 2022-01-11

## 2022-01-11 RX ORDER — EMPAGLIFLOZIN 25 MG/1
25 TABLET, FILM COATED ORAL
Qty: 90 | Refills: 0 | Status: ACTIVE | COMMUNITY
Start: 2020-11-16

## 2022-01-11 RX ORDER — PANTOPRAZOLE 40 MG/1
40 TABLET, DELAYED RELEASE ORAL
Qty: 90 | Refills: 3 | Status: DISCONTINUED | COMMUNITY
Start: 2017-12-29 | End: 2022-01-11

## 2022-01-11 RX ORDER — PANTOPRAZOLE 40 MG/1
40 TABLET, DELAYED RELEASE ORAL
Qty: 90 | Refills: 1 | Status: ACTIVE | COMMUNITY
Start: 2017-07-06

## 2022-01-11 RX ORDER — METOPROLOL SUCCINATE 50 MG/1
50 TABLET, EXTENDED RELEASE ORAL
Qty: 180 | Refills: 0 | Status: ACTIVE | COMMUNITY
Start: 2021-04-23

## 2022-01-11 RX ORDER — FLUTICASONE PROPIONATE 50 MCG
SPRAY, SUSPENSION NASAL
Refills: 0 | Status: ACTIVE | COMMUNITY

## 2022-01-11 NOTE — ASSESSMENT
[FreeTextEntry1] : Known history of universal diverticulosis.  Personal history of colon polyps.  Long segment Webster's esophagus.  Recent alteration in pattern of bowel movements possibly precipitated by use of a vegetable/mineral supplement.\par Main complaint is dry stool with no formed bowel movements.  Advised use of Benefiber at 1 tablespoon p.o. twice daily and MiraLAX 1-3 doses per day on a as needed basis.  Current physical exam is normal including digital rectal exam.  For occult blood is negative.  Maintain adequate hydration with at least 80 ounces of fluids per day.  GI office reevaluation here in 3 months.  Next endoscopy/colonoscopy in 2.5 years.

## 2022-01-11 NOTE — HISTORY OF PRESENT ILLNESS
[FreeTextEntry1] : 76-year-old white male retired  who was last seen here for endoscopy and colonoscopy on 6/2/2021.  Endoscopy demonstrated long segment Webster's esophagus and serial biopsies throughout the esophagus were positive for Ewbster's but negative for dysplasia.  No lesions were identified.  No nodules.  He remains on chronic PPI therapy with minimal symptomatology.  A surveillance colonoscopy for history of colon polyps revealed universal diverticulosis and tubular adenomas x3 removed from the cecum ascending colon and transverse colon.  He was advised a 3-year interval follow-up for both exams.\par About 4 months ago he began the use of a dietary supplement, balance of nature where pill form of multiple vegetables vitamins and minerals were being taken for about a 3-week period of time.  Bowel movements which had previously been normal reverted to constipation.  They were accompanied by gas and nausea and cramps which persisted for about 3 months.  Balance of nature had already been stopped.  Patient really has adjusted his diet and serial including Cheerios and special K were omitted.  And eggs yogurt and low starches were substituted.  Overall hemoglobin A1c improved from the average glucose of 140 range down to 106.  He is consuming pork and chicken and fish and steamed vegetables including broccoli and cauliflower.  Straining at the stools persists nausea persists but denies having any fever or vomiting.  He is using Gaviscon and Tums for control of heartburn assist in addition to the PPI medication.  No weight loss.  No bleeding.  No fever.  No shortness of breath.  No COVID.

## 2022-01-11 NOTE — PHYSICAL EXAM
[General Appearance - Alert] : alert [General Appearance - In No Acute Distress] : in no acute distress [Sclera] : the sclera and conjunctiva were normal [PERRL With Normal Accommodation] : pupils were equal in size, round, and reactive to light [Extraocular Movements] : extraocular movements were intact [Outer Ear] : the ears and nose were normal in appearance [Oropharynx] : the oropharynx was normal [Neck Appearance] : the appearance of the neck was normal [Neck Cervical Mass (___cm)] : no neck mass was observed [Jugular Venous Distention Increased] : there was no jugular-venous distention [Thyroid Diffuse Enlargement] : the thyroid was not enlarged [Thyroid Nodule] : there were no palpable thyroid nodules [Auscultation Breath Sounds / Voice Sounds] : lungs were clear to auscultation bilaterally [Heart Rate And Rhythm] : heart rate was normal and rhythm regular [Heart Sounds] : normal S1 and S2 [Heart Sounds Gallop] : no gallops [Murmurs] : no murmurs [Heart Sounds Pericardial Friction Rub] : no pericardial rub [Bowel Sounds] : normal bowel sounds [Abdomen Soft] : soft [Abdomen Tenderness] : non-tender [Abdomen Mass (___ Cm)] : no abdominal mass palpated [Normal Sphincter Tone] : normal sphincter tone [No Rectal Mass] : no rectal mass [Internal Hemorrhoid] : no internal hemorrhoids [External Hemorrhoid] : no external hemorrhoids [Occult Blood Positive] : stool was negative for occult blood [Cervical Lymph Nodes Enlarged Posterior Bilaterally] : posterior cervical [Cervical Lymph Nodes Enlarged Anterior Bilaterally] : anterior cervical [Supraclavicular Lymph Nodes Enlarged Bilaterally] : supraclavicular [Axillary Lymph Nodes Enlarged Bilaterally] : axillary [Femoral Lymph Nodes Enlarged Bilaterally] : femoral [Inguinal Lymph Nodes Enlarged Bilaterally] : inguinal [No CVA Tenderness] : no ~M costovertebral angle tenderness [No Spinal Tenderness] : no spinal tenderness [Abnormal Walk] : normal gait [Nail Clubbing] : no clubbing  or cyanosis of the fingernails [Musculoskeletal - Swelling] : no joint swelling seen [Motor Tone] : muscle strength and tone were normal [FreeTextEntry1] : left knee scars [Skin Color & Pigmentation] : normal skin color and pigmentation [Skin Turgor] : normal skin turgor [] : no rash

## 2022-02-24 ENCOUNTER — APPOINTMENT (OUTPATIENT)
Dept: DERMATOLOGY | Facility: CLINIC | Age: 77
End: 2022-02-24

## 2022-03-31 ENCOUNTER — RESULT REVIEW (OUTPATIENT)
Age: 77
End: 2022-03-31

## 2022-04-01 ENCOUNTER — APPOINTMENT (OUTPATIENT)
Dept: DERMATOLOGY | Facility: CLINIC | Age: 77
End: 2022-04-01
Payer: MEDICARE

## 2022-04-01 PROCEDURE — 99203 OFFICE O/P NEW LOW 30 MIN: CPT | Mod: 25

## 2022-04-01 PROCEDURE — 17000 DESTRUCT PREMALG LESION: CPT

## 2022-05-24 ENCOUNTER — APPOINTMENT (OUTPATIENT)
Dept: GASTROENTEROLOGY | Facility: CLINIC | Age: 77
End: 2022-05-24

## 2022-08-24 ENCOUNTER — APPOINTMENT (OUTPATIENT)
Dept: DERMATOLOGY | Facility: CLINIC | Age: 77
End: 2022-08-24

## 2022-08-24 PROCEDURE — 99214 OFFICE O/P EST MOD 30 MIN: CPT

## 2022-09-10 ENCOUNTER — EMERGENCY (EMERGENCY)
Facility: HOSPITAL | Age: 77
LOS: 1 days | Discharge: HOME | End: 2022-09-10
Attending: EMERGENCY MEDICINE
Payer: MEDICARE

## 2022-09-10 VITALS
OXYGEN SATURATION: 96 % | DIASTOLIC BLOOD PRESSURE: 57 MMHG | TEMPERATURE: 98 F | WEIGHT: 192.9 LBS | SYSTOLIC BLOOD PRESSURE: 116 MMHG | HEIGHT: 69 IN | HEART RATE: 62 BPM | RESPIRATION RATE: 20 BRPM

## 2022-09-10 PROCEDURE — 82962 GLUCOSE BLOOD TEST: CPT

## 2022-09-10 PROCEDURE — 99284 EMERGENCY DEPT VISIT MOD MDM: CPT

## 2022-09-10 PROCEDURE — 93005 ELECTROCARDIOGRAM TRACING: CPT

## 2022-09-10 PROCEDURE — 99283 EMERGENCY DEPT VISIT LOW MDM: CPT

## 2022-09-10 PROCEDURE — 93010 ELECTROCARDIOGRAM REPORT: CPT

## 2022-09-10 NOTE — ED ADULT NURSE NOTE - OBJECTIVE STATEMENT
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 States he went outside to smoke a cigar and felt dizzy s he went into the car and turned on the AC with a friend and during that time he had multiple syncopal episodes. States this has happened before.

## 2022-09-10 NOTE — ED PROVIDER NOTE - OBJECTIVE STATEMENT
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 states once a year passes out , long h/o syncope   no chest pain , has been worked up multiple times

## 2022-09-10 NOTE — ED ADULT TRIAGE NOTE - CHIEF COMPLAINT QUOTE
C/o syncope. Pt states, "I had multiple syncopal episodes in the car that my friend saw". Denies chest pain, dizziness, nausea, vomiting. Hx of syncope.

## 2022-09-20 ENCOUNTER — APPOINTMENT (OUTPATIENT)
Dept: GASTROENTEROLOGY | Facility: CLINIC | Age: 77
End: 2022-09-20

## 2022-10-04 ENCOUNTER — APPOINTMENT (OUTPATIENT)
Dept: DERMATOLOGY | Facility: CLINIC | Age: 77
End: 2022-10-04

## 2022-10-04 PROCEDURE — 17000 DESTRUCT PREMALG LESION: CPT

## 2022-10-04 PROCEDURE — 99213 OFFICE O/P EST LOW 20 MIN: CPT | Mod: 25

## 2022-12-07 ENCOUNTER — OFFICE (OUTPATIENT)
Dept: URBAN - METROPOLITAN AREA CLINIC 113 | Facility: CLINIC | Age: 77
Setting detail: OPHTHALMOLOGY
End: 2022-12-07
Payer: MEDICARE

## 2022-12-07 DIAGNOSIS — H04.122: ICD-10-CM

## 2022-12-07 DIAGNOSIS — H01.002: ICD-10-CM

## 2022-12-07 DIAGNOSIS — H04.123: ICD-10-CM

## 2022-12-07 DIAGNOSIS — H04.121: ICD-10-CM

## 2022-12-07 DIAGNOSIS — H01.005: ICD-10-CM

## 2022-12-07 DIAGNOSIS — E11.3293: ICD-10-CM

## 2022-12-07 DIAGNOSIS — H40.013: ICD-10-CM

## 2022-12-07 PROCEDURE — 68761 CLOSE TEAR DUCT OPENING: CPT | Performed by: OPHTHALMOLOGY

## 2022-12-07 PROCEDURE — 99213 OFFICE O/P EST LOW 20 MIN: CPT | Performed by: OPHTHALMOLOGY

## 2022-12-07 PROCEDURE — 83861 MICROFLUID ANALY TEARS: CPT | Performed by: OPHTHALMOLOGY

## 2022-12-07 ASSESSMENT — LID POSITION - ECTROPION
OD_ECTROPION: RLL 1+
OS_ECTROPION: LLL 1+

## 2022-12-07 ASSESSMENT — AXIALLENGTH_DERIVED
OS_AL: 23.3618
OD_AL: 23.56
OS_AL: 23.701
OS_AL: 23.4577

## 2022-12-07 ASSESSMENT — REFRACTION_MANIFEST
OD_AXIS: 070
OS_VA1: 20/25
OD_SPHERE: PL
OS_SPHERE: +0.50
OS_AXIS: 045
OS_SPHERE: +0.25
OD_VA1: 20/25
OS_VA1: 20/20-
OS_CYLINDER: -0.75
OS_AXIS: 060
OD_CYLINDER: -1.00
OS_CYLINDER: -0.75

## 2022-12-07 ASSESSMENT — REFRACTION_AUTOREFRACTION
OD_CYLINDER: -1.75
OS_SPHERE: -0.50
OS_CYLINDER: -0.50
OS_AXIS: 047
OD_AXIS: 075
OD_SPHERE: +0.25

## 2022-12-07 ASSESSMENT — LID EXAM ASSESSMENTS
OD_BLEPHARITIS: RLL 1+
OS_BLEPHARITIS: LLL 1+

## 2022-12-07 ASSESSMENT — PUNCTA - ASSESSMENT: OD_PUNCTA: COL PLUG

## 2022-12-07 ASSESSMENT — REFRACTION_CURRENTRX
OS_SPHERE: +1.25
OD_VPRISM_DIRECTION: PROGS
OD_OVR_VA: 20/
OS_ADD: +2.50
OS_SPHERE: +2.50
OS_OVR_VA: 20/
OD_ADD: +2.50
OS_OVR_VA: 20/
OD_AXIS: 082
OD_OVR_VA: 20/
OS_AXIS: 073
OD_CYLINDER: -3.25
OS_VPRISM_DIRECTION: PROGS
OD_SPHERE: +1.25
OS_CYLINDER: -3.25
OD_SPHERE: +2.00

## 2022-12-07 ASSESSMENT — TONOMETRY
OD_IOP_MMHG: 16
OS_IOP_MMHG: 15

## 2022-12-07 ASSESSMENT — VISUAL ACUITY
OD_BCVA: 20/20
OS_BCVA: 20/20-1

## 2022-12-07 ASSESSMENT — KERATOMETRY
METHOD_AUTO_MANUAL: AUTO
OD_AXISANGLE_DEGREES: 006
OD_K2POWER_DIOPTERS: 45.50
OS_K2POWER_DIOPTERS: 45.25
OS_AXISANGLE_DEGREES: 170
OD_K1POWER_DIOPTERS: 43.00
OS_K1POWER_DIOPTERS: 42.75

## 2022-12-07 ASSESSMENT — CONFRONTATIONAL VISUAL FIELD TEST (CVF)
OD_FINDINGS: FULL
OS_FINDINGS: FULL

## 2022-12-07 ASSESSMENT — TEAR BREAK UP TIME (TBUT)
OS_TBUT: 3SEC
OD_TBUT: 3SEC

## 2022-12-07 ASSESSMENT — SUPERFICIAL PUNCTATE KERATITIS (SPK)
OS_SPK: ABSENT
OD_SPK: ABSENT

## 2022-12-07 ASSESSMENT — SPHEQUIV_DERIVED
OS_SPHEQUIV: 0.125
OS_SPHEQUIV: -0.125
OD_SPHEQUIV: -0.625
OS_SPHEQUIV: -0.75

## 2023-04-03 ENCOUNTER — APPOINTMENT (OUTPATIENT)
Dept: DERMATOLOGY | Facility: CLINIC | Age: 78
End: 2023-04-03

## 2023-04-05 ENCOUNTER — OFFICE (OUTPATIENT)
Dept: URBAN - METROPOLITAN AREA CLINIC 113 | Facility: CLINIC | Age: 78
Setting detail: OPHTHALMOLOGY
End: 2023-04-05
Payer: MEDICARE

## 2023-04-05 DIAGNOSIS — H04.122: ICD-10-CM

## 2023-04-05 DIAGNOSIS — H01.005: ICD-10-CM

## 2023-04-05 DIAGNOSIS — H35.033: ICD-10-CM

## 2023-04-05 DIAGNOSIS — E11.3293: ICD-10-CM

## 2023-04-05 DIAGNOSIS — H04.121: ICD-10-CM

## 2023-04-05 DIAGNOSIS — H01.002: ICD-10-CM

## 2023-04-05 DIAGNOSIS — H40.013: ICD-10-CM

## 2023-04-05 PROCEDURE — 92014 COMPRE OPH EXAM EST PT 1/>: CPT | Performed by: OPHTHALMOLOGY

## 2023-04-05 PROCEDURE — 68761 CLOSE TEAR DUCT OPENING: CPT | Performed by: OPHTHALMOLOGY

## 2023-04-05 PROCEDURE — 92250 FUNDUS PHOTOGRAPHY W/I&R: CPT | Performed by: OPHTHALMOLOGY

## 2023-04-05 PROCEDURE — 83861 MICROFLUID ANALY TEARS: CPT | Performed by: OPHTHALMOLOGY

## 2023-04-05 ASSESSMENT — REFRACTION_CURRENTRX
OD_VPRISM_DIRECTION: PROGS
OD_OVR_VA: 20/
OD_SPHERE: +1.25
OS_SPHERE: +2.50
OD_AXIS: 082
OS_VPRISM_DIRECTION: PROGS
OD_ADD: +2.50
OS_CYLINDER: -3.25
OS_AXIS: 073
OD_OVR_VA: 20/
OS_OVR_VA: 20/
OS_ADD: +2.50
OD_SPHERE: +2.00
OS_SPHERE: +1.25
OS_OVR_VA: 20/
OD_CYLINDER: -3.25

## 2023-04-05 ASSESSMENT — REFRACTION_MANIFEST
OS_VA1: 20/20-
OS_AXIS: 060
OS_VA1: 20/25
OS_SPHERE: +0.25
OD_VA1: 20/25
OD_AXIS: 070
OS_CYLINDER: -0.75
OS_AXIS: 045
OS_CYLINDER: -0.75
OD_CYLINDER: -1.00
OD_SPHERE: PL
OS_SPHERE: +0.50

## 2023-04-05 ASSESSMENT — REFRACTION_AUTOREFRACTION
OD_AXIS: 073
OD_SPHERE: +0.25
OS_SPHERE: 0.00
OS_AXIS: 071
OD_CYLINDER: -1.75
OS_CYLINDER: -1.00

## 2023-04-05 ASSESSMENT — AXIALLENGTH_DERIVED
OS_AL: 23.4124
OS_AL: 23.5572
OS_AL: 23.3169
OD_AL: 23.6058

## 2023-04-05 ASSESSMENT — SUPERFICIAL PUNCTATE KERATITIS (SPK)
OD_SPK: ABSENT
OS_SPK: ABSENT

## 2023-04-05 ASSESSMENT — KERATOMETRY
OS_K2POWER_DIOPTERS: 45.25
OD_K1POWER_DIOPTERS: 42.75
METHOD_AUTO_MANUAL: AUTO
OS_K1POWER_DIOPTERS: 43.00
OD_AXISANGLE_DEGREES: 004
OS_AXISANGLE_DEGREES: 176
OD_K2POWER_DIOPTERS: 45.50

## 2023-04-05 ASSESSMENT — SPHEQUIV_DERIVED
OS_SPHEQUIV: -0.5
OS_SPHEQUIV: -0.125
OD_SPHEQUIV: -0.625
OS_SPHEQUIV: 0.125

## 2023-04-05 ASSESSMENT — CONFRONTATIONAL VISUAL FIELD TEST (CVF)
OS_FINDINGS: FULL
OD_FINDINGS: FULL

## 2023-04-05 ASSESSMENT — VISUAL ACUITY
OS_BCVA: 20/30
OD_BCVA: 20/20

## 2023-04-05 ASSESSMENT — LID POSITION - ECTROPION
OS_ECTROPION: LLL 1+
OD_ECTROPION: RLL 1+

## 2023-04-05 ASSESSMENT — PUNCTA - ASSESSMENT: OS_PUNCTA: COL PLUG

## 2023-04-05 ASSESSMENT — TONOMETRY
OS_IOP_MMHG: 14
OD_IOP_MMHG: 14

## 2023-04-05 ASSESSMENT — LID EXAM ASSESSMENTS
OS_BLEPHARITIS: LLL 1+
OD_BLEPHARITIS: RLL 1+

## 2023-04-05 ASSESSMENT — TEAR BREAK UP TIME (TBUT)
OS_TBUT: 3SEC
OD_TBUT: 3SEC

## 2023-04-12 ENCOUNTER — OFFICE (OUTPATIENT)
Dept: URBAN - METROPOLITAN AREA CLINIC 104 | Facility: CLINIC | Age: 78
Setting detail: OPHTHALMOLOGY
End: 2023-04-12
Payer: MEDICARE

## 2023-04-12 VITALS — HEIGHT: 60 IN

## 2023-04-12 DIAGNOSIS — H02.132: ICD-10-CM

## 2023-04-12 DIAGNOSIS — H02.834: ICD-10-CM

## 2023-04-12 DIAGNOSIS — H02.135: ICD-10-CM

## 2023-04-12 DIAGNOSIS — H02.535: ICD-10-CM

## 2023-04-12 DIAGNOSIS — H02.831: ICD-10-CM

## 2023-04-12 DIAGNOSIS — H57.813: ICD-10-CM

## 2023-04-12 DIAGNOSIS — H53.40: ICD-10-CM

## 2023-04-12 DIAGNOSIS — H02.532: ICD-10-CM

## 2023-04-12 PROBLEM — H35.033 HYPERTENSIVE RETINOPATHY; BOTH EYES: Status: ACTIVE | Noted: 2023-04-05

## 2023-04-12 PROCEDURE — 92082 INTERMEDIATE VISUAL FIELD XM: CPT | Performed by: OPHTHALMOLOGY

## 2023-04-12 PROCEDURE — 92285 EXTERNAL OCULAR PHOTOGRAPHY: CPT | Performed by: OPHTHALMOLOGY

## 2023-04-12 PROCEDURE — 99214 OFFICE O/P EST MOD 30 MIN: CPT | Performed by: OPHTHALMOLOGY

## 2023-04-12 ASSESSMENT — REFRACTION_CURRENTRX
OS_SPHERE: +1.25
OD_SPHERE: +1.25
OS_VPRISM_DIRECTION: PROGS
OS_OVR_VA: 20/
OS_AXIS: 073
OD_VPRISM_DIRECTION: PROGS
OD_OVR_VA: 20/
OS_SPHERE: +2.50
OS_OVR_VA: 20/
OS_CYLINDER: -3.25
OD_SPHERE: +2.00
OD_ADD: +2.50
OD_CYLINDER: -3.25
OS_ADD: +2.50
OD_OVR_VA: 20/
OD_AXIS: 082

## 2023-04-12 ASSESSMENT — LID POSITION - LOWER LID LAG
OD_LOWER_LID_LAG: RLL 2+
OS_LOWER_LID_LAG: LLL 2+

## 2023-04-12 ASSESSMENT — SUPERFICIAL PUNCTATE KERATITIS (SPK)
OS_SPK: ABSENT
OD_SPK: ABSENT

## 2023-04-12 ASSESSMENT — PUNCTA - ASSESSMENT: OS_PUNCTA: COL PLUG

## 2023-04-12 ASSESSMENT — LID POSITION - ECTROPION
OD_ECTROPION: RLL 1+
OS_ECTROPION: LLL 1+

## 2023-04-12 ASSESSMENT — LID EXAM ASSESSMENTS
OD_COMMENTS: 2+ LAXIT
OS_COMMENTS: 2+ LAXIT
OS_BLEPHARITIS: LLL 1+
OD_BLEPHARITIS: RLL 1+

## 2023-04-12 ASSESSMENT — TEAR BREAK UP TIME (TBUT)
OS_TBUT: 3SEC
OD_TBUT: 3SEC

## 2023-04-12 ASSESSMENT — VISUAL ACUITY
OS_BCVA: 20/20
OD_BCVA: 20/25+1

## 2023-04-12 ASSESSMENT — CONFRONTATIONAL VISUAL FIELD TEST (CVF)
OD_FINDINGS: FULL
OS_FINDINGS: FULL

## 2023-04-12 ASSESSMENT — LID POSITION - DERMATOCHALASIS
OD_DERMATOCHALASIS: RUL 2+
OS_DERMATOCHALASIS: LUL 2+

## 2023-09-09 NOTE — ED ADULT TRIAGE NOTE - NS ED TRIAGE AVPU SCALE
Alert-The patient is alert, awake and responds to voice. The patient is oriented to time, place, and person. The triage nurse is able to obtain subjective information. [FreeTextEntry1] : surveillance - CLIF 3

## 2023-11-14 ENCOUNTER — APPOINTMENT (OUTPATIENT)
Dept: GASTROENTEROLOGY | Facility: CLINIC | Age: 78
End: 2023-11-14
Payer: MEDICARE

## 2023-11-14 VITALS
DIASTOLIC BLOOD PRESSURE: 78 MMHG | WEIGHT: 200 LBS | TEMPERATURE: 98 F | OXYGEN SATURATION: 97 % | RESPIRATION RATE: 14 BRPM | HEART RATE: 71 BPM | HEIGHT: 71 IN | SYSTOLIC BLOOD PRESSURE: 134 MMHG | BODY MASS INDEX: 28 KG/M2

## 2023-11-14 DIAGNOSIS — K22.70 BARRETT'S ESOPHAGUS W/OUT DYSPLASIA: ICD-10-CM

## 2023-11-14 DIAGNOSIS — M17.12 UNILATERAL PRIMARY OSTEOARTHRITIS, LEFT KNEE: ICD-10-CM

## 2023-11-14 DIAGNOSIS — Z86.010 PERSONAL HISTORY OF COLONIC POLYPS: ICD-10-CM

## 2023-11-14 DIAGNOSIS — K21.9 GASTRO-ESOPHAGEAL REFLUX DISEASE W/OUT ESOPHAGITIS: ICD-10-CM

## 2023-11-14 DIAGNOSIS — K57.30 DIVERTICULOSIS OF LARGE INTESTINE W/OUT PERFORATION OR ABSCESS W/OUT BLEEDING: ICD-10-CM

## 2023-11-14 DIAGNOSIS — R19.4 CHANGE IN BOWEL HABIT: ICD-10-CM

## 2023-11-14 DIAGNOSIS — Z86.39 PERSONAL HISTORY OF OTHER ENDOCRINE, NUTRITIONAL AND METABOLIC DISEASE: ICD-10-CM

## 2023-11-14 DIAGNOSIS — Z86.79 PERSONAL HISTORY OF OTHER DISEASES OF THE CIRCULATORY SYSTEM: ICD-10-CM

## 2023-11-14 DIAGNOSIS — K63.5 POLYP OF COLON: ICD-10-CM

## 2023-11-14 PROCEDURE — 99214 OFFICE O/P EST MOD 30 MIN: CPT

## 2023-11-14 RX ORDER — GLIMEPIRIDE 4 MG/1
4 TABLET ORAL
Refills: 0 | Status: ACTIVE | COMMUNITY

## 2023-11-14 RX ORDER — SODIUM SULFATE, POTASSIUM SULFATE AND MAGNESIUM SULFATE 1.6; 3.13; 17.5 G/177ML; G/177ML; G/177ML
17.5-3.13-1.6 SOLUTION ORAL
Qty: 1 | Refills: 0 | Status: ACTIVE | COMMUNITY
Start: 2023-11-14 | End: 1900-01-01

## 2023-11-14 RX ORDER — SAXAGLIPTIN 5 MG/1
5 TABLET, FILM COATED ORAL
Qty: 30 | Refills: 0 | Status: DISCONTINUED | COMMUNITY
Start: 2017-06-01 | End: 2023-11-14

## 2023-11-14 RX ORDER — BENAZEPRIL HYDROCHLORIDE 20 MG/1
20 TABLET, FILM COATED ORAL
Qty: 90 | Refills: 0 | Status: DISCONTINUED | COMMUNITY
Start: 2017-06-19 | End: 2023-11-14

## 2023-11-15 PROBLEM — Z86.010 HISTORY OF COLON POLYPS: Status: RESOLVED | Noted: 2017-07-06 | Resolved: 2023-01-01

## 2023-11-15 PROBLEM — Z86.39 HISTORY OF OBESITY: Status: RESOLVED | Noted: 2017-07-06 | Resolved: 2023-01-01

## 2023-11-15 PROBLEM — R19.4 ALTERED BOWEL HABITS: Status: ACTIVE | Noted: 2022-01-11

## 2023-11-15 PROBLEM — M17.12 OSTEOARTHRITIS OF LEFT KNEE: Status: RESOLVED | Noted: 2017-07-06 | Resolved: 2023-11-15

## 2023-11-15 PROBLEM — Z86.39 HISTORY OF TYPE 2 DIABETES MELLITUS: Status: RESOLVED | Noted: 2017-07-06 | Resolved: 2023-01-01

## 2023-11-15 PROBLEM — Z86.79 HISTORY OF HYPERTENSION: Status: RESOLVED | Noted: 2017-07-06 | Resolved: 2023-11-15

## 2023-11-16 ENCOUNTER — APPOINTMENT (OUTPATIENT)
Dept: DERMATOLOGY | Facility: CLINIC | Age: 78
End: 2023-11-16
Payer: MEDICARE

## 2023-11-16 PROCEDURE — 99213 OFFICE O/P EST LOW 20 MIN: CPT | Mod: 25

## 2023-11-16 PROCEDURE — 17003 DESTRUCT PREMALG LES 2-14: CPT

## 2023-11-16 PROCEDURE — 17000 DESTRUCT PREMALG LESION: CPT

## 2024-03-07 ENCOUNTER — TRANSCRIPTION ENCOUNTER (OUTPATIENT)
Age: 79
End: 2024-03-07

## 2024-07-17 ENCOUNTER — APPOINTMENT (OUTPATIENT)
Dept: GASTROENTEROLOGY | Facility: GI CENTER | Age: 79
End: 2024-07-17